# Patient Record
Sex: MALE | Race: BLACK OR AFRICAN AMERICAN | NOT HISPANIC OR LATINO | Employment: OTHER | URBAN - METROPOLITAN AREA
[De-identification: names, ages, dates, MRNs, and addresses within clinical notes are randomized per-mention and may not be internally consistent; named-entity substitution may affect disease eponyms.]

---

## 2022-10-21 ENCOUNTER — HOSPITAL ENCOUNTER (EMERGENCY)
Facility: CLINIC | Age: 42
Discharge: HOME | End: 2022-10-21
Attending: EMERGENCY MEDICINE

## 2022-10-21 ENCOUNTER — HOSPITAL ENCOUNTER (EMERGENCY)
Facility: CLINIC | Age: 42
Discharge: HOME | End: 2022-10-21
Attending: EMERGENCY MEDICINE | Admitting: EMERGENCY MEDICINE
Payer: COMMERCIAL

## 2022-10-21 ENCOUNTER — APPOINTMENT (OUTPATIENT)
Dept: RADIOLOGY | Facility: CLINIC | Age: 42
End: 2022-10-21
Attending: EMERGENCY MEDICINE
Payer: COMMERCIAL

## 2022-10-21 VITALS
BODY MASS INDEX: 23.32 KG/M2 | TEMPERATURE: 98.5 F | WEIGHT: 140 LBS | HEART RATE: 77 BPM | DIASTOLIC BLOOD PRESSURE: 81 MMHG | OXYGEN SATURATION: 94 % | SYSTOLIC BLOOD PRESSURE: 126 MMHG | RESPIRATION RATE: 18 BRPM | HEIGHT: 65 IN

## 2022-10-21 VITALS
WEIGHT: 140 LBS | DIASTOLIC BLOOD PRESSURE: 89 MMHG | RESPIRATION RATE: 18 BRPM | BODY MASS INDEX: 23.32 KG/M2 | SYSTOLIC BLOOD PRESSURE: 144 MMHG | OXYGEN SATURATION: 96 % | HEART RATE: 93 BPM | HEIGHT: 65 IN

## 2022-10-21 DIAGNOSIS — J45.901 MILD ASTHMA WITH EXACERBATION, UNSPECIFIED WHETHER PERSISTENT: ICD-10-CM

## 2022-10-21 LAB
FLUAV RNA SPEC QL NAA+PROBE: NEGATIVE
FLUBV RNA RESP QL NAA+PROBE: NEGATIVE
RSV RNA SPEC NAA+PROBE: NEGATIVE
SARS-COV-2 RNA RESP QL NAA+PROBE: NEGATIVE

## 2022-10-21 PROCEDURE — 250N000012 HC RX MED GY IP 250 OP 636 PS 637: Performed by: EMERGENCY MEDICINE

## 2022-10-21 PROCEDURE — 94640 AIRWAY INHALATION TREATMENT: CPT

## 2022-10-21 PROCEDURE — 87637 SARSCOV2&INF A&B&RSV AMP PRB: CPT | Performed by: EMERGENCY MEDICINE

## 2022-10-21 PROCEDURE — 99284 EMERGENCY DEPT VISIT MOD MDM: CPT | Mod: CS,25

## 2022-10-21 PROCEDURE — C9803 HOPD COVID-19 SPEC COLLECT: HCPCS

## 2022-10-21 PROCEDURE — 71046 X-RAY EXAM CHEST 2 VIEWS: CPT

## 2022-10-21 PROCEDURE — 250N000013 HC RX MED GY IP 250 OP 250 PS 637: Performed by: EMERGENCY MEDICINE

## 2022-10-21 RX ORDER — PREDNISONE 20 MG/1
TABLET ORAL
Qty: 10 TABLET | Refills: 0 | Status: SHIPPED | OUTPATIENT
Start: 2022-10-21

## 2022-10-21 RX ORDER — ALBUTEROL SULFATE 90 UG/1
2 AEROSOL, METERED RESPIRATORY (INHALATION) EVERY 6 HOURS PRN
Status: DISCONTINUED | OUTPATIENT
Start: 2022-10-21 | End: 2022-10-21 | Stop reason: HOSPADM

## 2022-10-21 RX ORDER — ALBUTEROL SULFATE 90 UG/1
2 AEROSOL, METERED RESPIRATORY (INHALATION) EVERY 6 HOURS PRN
Qty: 18 G | Refills: 0 | Status: SHIPPED | OUTPATIENT
Start: 2022-10-21

## 2022-10-21 RX ORDER — PREDNISONE 20 MG/1
60 TABLET ORAL ONCE
Status: COMPLETED | OUTPATIENT
Start: 2022-10-21 | End: 2022-10-21

## 2022-10-21 RX ADMIN — PREDNISONE 60 MG: 20 TABLET ORAL at 10:09

## 2022-10-21 RX ADMIN — ALBUTEROL SULFATE 2 PUFF: 90 AEROSOL, METERED RESPIRATORY (INHALATION) at 10:10

## 2022-10-21 NOTE — ED TRIAGE NOTES
Patient presents to the ED with complaints of increased SOB over the past 3 days. He also reports that he has asthma and has not had his medications available as he recently moved.

## 2022-10-21 NOTE — Clinical Note
Kathryn Hannah was seen and treated in our emergency department on 10/21/2022.  He may return to work on 10/22/2022.       If you have any questions or concerns, please don't hesitate to call.      Kasey Delgado MD

## 2022-10-21 NOTE — ED TRIAGE NOTES
Pt states for the 2-3 days increased shortness of breath. Hx of asthma. Taking rescue inhaler, but states he ran out of his doses and waiting on a new prescription since recently moving. Denies chest pain. States unable to sleep the past couple days due to shortness of breath and productive cough.

## 2022-10-21 NOTE — ED PROVIDER NOTES
EMERGENCY DEPARTMENT ENCOUNTER      NAME: Kathryn Hannah  AGE: 41 year old male  YOB: 1980  MRN: [id number]  EVALUATION DATE & TIME: No admission date for patient encounter.    PCP: No Ref-Primary, Physician    ED PROVIDER: Kasey Delgado M.D.      Chief Complaint   Patient presents with     Shortness of Breath         FINAL IMPRESSION:  1. Mild asthma with exacerbation, unspecified whether persistent          ED COURSE & MEDICAL DECISION MAKING:    ED Course as of 10/21/22 1041   Fri Oct 21, 2022   0904 Pt with cough with h/o asthma with coarse BS with excellent air entry and VS WNL, amenable to albuterol/prednisone and XR, COVID19 PCR/influenza and plan to clinically reassess   0956 CXR with findings suggestive of emphysema wihtout infiltrate of PNA consistent with tobacco use history, patient amenable to follow up COVID19, influena and RSV PCR on mychart, steorid/albuterol and Rx to pharmacy. Patient discharged after being provided with extensive anticipatory guidance and given return precautions, importance of PMD follow-up emphasized.        Pertinent Labs & Imaging studies reviewed. (See chart for details)    N95 worn  A face shield was worn also  COVID PPE    8:51 AM I met with the patient to gather history and to perform my initial exam. We discussed plans for the ED course, including diagnostic testing and treatment.   At the conclusion of the encounter I discussed the results of all of the tests and the disposition. The questions were answered. The patient or family acknowledged understanding and was agreeable with the care plan.     MEDICATIONS GIVEN IN THE EMERGENCY:  Medications   albuterol (PROVENTIL HFA/VENTOLIN HFA) inhaler (2 puffs Inhalation Given 10/21/22 1010)   predniSONE (DELTASONE) tablet 60 mg (60 mg Oral Given 10/21/22 1009)       NEW PRESCRIPTIONS STARTED AT TODAY'S ER VISIT  Discharge Medication List as of 10/21/2022 10:13 AM      START taking these medications    Details    albuterol (PROAIR HFA/PROVENTIL HFA/VENTOLIN HFA) 108 (90 Base) MCG/ACT inhaler Inhale 2 puffs into the lungs every 6 hours as needed for shortness of breath / dyspnea or wheezing, Disp-18 g, R-0, E-PrescribePharmacy may dispense brand covered by insurance (Proair, or proventil or ventolin or generic albuterol inhaler)      predniSONE (DELTASONE) 20 MG tablet Take two tablets (= 40mg) each day for 5 (five) days, Disp-10 tablet, R-0, E-Prescribe                =================================================================    HPI      Kathryn Hannah is a 41 year old male with PMHx of asthma who presents to the ED today via private vehicle with shortness of breath and a cough.     Patient reports 3 days of a cough that is productive of clear sputum. He also endorses mild shortness of breath but denies any wheezing. He recently moved from [place] and has some albuterol with him, and used one dose this morning with mild symptom improvement. Patient states these symptoms feel like his prior asthma exacerbations, and he typically gets a prednisone prescription when he has a flare up. He is fully vaccinated for COVID-19 and denies any chance of having COVID or the flu, but has not been tested in the past 3 days. Denies any fever. Patient smokes. No other complaints or concerns expressed at this time.       REVIEW OF SYSTEMS   All other systems reviewed and are negative except as noted above in HPI.    PAST MEDICAL HISTORY:  History reviewed. No pertinent past medical history.    PAST SURGICAL HISTORY:  History reviewed. No pertinent surgical history.    CURRENT MEDICATIONS:    albuterol (PROAIR HFA/PROVENTIL HFA/VENTOLIN HFA) 108 (90 Base) MCG/ACT inhaler  predniSONE (DELTASONE) 20 MG tablet        ALLERGIES:  No Known Allergies    FAMILY HISTORY:  History reviewed. No pertinent family history.    SOCIAL HISTORY:   Social History     Socioeconomic History     Marital status:        VITALS:  Patient Vitals  "for the past 24 hrs:   BP Temp src Pulse Resp SpO2 Height Weight   10/21/22 0834 (!) 144/89 Oral 93 18 96 % 1.651 m (5' 5\") 63.5 kg (140 lb)       PHYSICAL EXAM    GENERAL: Awake, alert.  In no acute distress.   HEENT: Normocephalic, atraumatic.  Pupils equal, round and reactive.  Conjunctiva normal.  EOMI.  NECK: No stridor or apparent deformity.  PULMONARY: Diffuse coarse breath sounds with a slightly prolonged expiratory phase. Excellent air entry. No retractions or tachypnea. Speaking in full sentences while partly reclined.   CARDIO: Regular rate and rhythm.  No significant murmur, rub or gallop.  Radial pulses strong and symmetrical.  ABDOMINAL: Abdomen soft, non-distended and non-tender to palpation.  No CVAT, no palpable hepatosplenomegaly.  EXTREMITIES: No lower extremity swelling or edema.    NEURO: Alert and oriented to person, place and time.  Cranial nerves grossly intact.  No focal motor deficit.  PSYCH: Normal mood and affect  SKIN: No rashes      LAB:  All pertinent labs reviewed and interpreted.  Results for orders placed or performed during the hospital encounter of 10/21/22   XR Chest 2 Views    Impression    IMPRESSION: Heart size and pulmonary vascularity normal. Hyperinflated lungs suggesting emphysema. No infiltrates or effusions.       RADIOLOGY:  Reviewed all pertinent imaging. Please see official radiology report.  XR Chest 2 Views   Final Result   IMPRESSION: Heart size and pulmonary vascularity normal. Hyperinflated lungs suggesting emphysema. No infiltrates or effusions.                    I, Radha Jenkins, am serving as a scribe to document services personally performed by Dr. Kasey Delgado based on my observation and the provider's statements to me. IKasey MD attest that Radha Jenkins is acting in a scribe capacity, has observed my performance of the services and has documented them in accordance with my direction.     Kasey Delgado MD  10/21/22 1041    "